# Patient Record
(demographics unavailable — no encounter records)

---

## 2024-11-22 NOTE — CARDIOLOGY SUMMARY
[de-identified] : 11/22/2024, NSR with IVCD, non-specific ST-T wave changes 5/10/2024, NSR with IVCD, non-specific ST-T wave changes 11/10/2023, NSR with non-specific T wave changes. 9/12/2022, NSR with low voltages, IVCD, non-specific T wave changes [de-identified] : 11/22/2024, LV EF 45%, mild MR, mild AI, RWMAs (stable from previous), trace TR with normal estimated PASP. 7/9/2022, LV EF 40-45%, mild-moderate MR, mild AI, basal inferolateral, inferoseptum, basal inferior wall hypokinesis. trace TR. 8/24/2021, LV EF 45-50%, mild MR, trace AI, basal septum, basal inferior hypokinesis, and basal inferolateral dyskinetic. abnormal septal motion, trace TR.

## 2024-11-22 NOTE — REVIEW OF SYSTEMS
[Easy Bleeding] : no tendency for easy bleeding [Easy Bruising] : no tendency for easy bruising [Negative] : Neurological [FreeTextEntry5] : see HPI [FreeTextEntry6] : see HPI [FreeTextEntry7] : see HPI

## 2024-11-22 NOTE — DISCUSSION/SUMMARY
[FreeTextEntry1] : 1. Coronary Artery Disease: according to previous cardiologist's documentation patient has  of the RCA with left-->right collaterals. Patient with no exertional chest pain or dyspnea. Recommend continuing Aspirin 81mg daily. Recommend continuing Toprol XL 50mg daily. Recommend continuing atorvastatin 80mg daily. Goal LDL is less than 70.   2. HTN: controlled. Continue  Toprol XL 50mg daily. Goal BP less than 130/80. Recommend low salt diet.   3. HLD: Goal LDL is less than 70.  Recommend continuing atorvastatin 80mg daily.   4. Hx of TIA: occurred in 2016. Appeared cryptogenic so patient underwent implantation of ILR. According to previous cardiologist documentation there was no documented PAF episodes. Battery is dead. Continue Aspirin 81mg daily and increase atorvastatin to 80mg daily. Goal LDL is less than 70.  5. Ischemic CM: echocardiogram report from July 2022 reviewed. LV EF 40- 45%. Regional wall motion abnormalities consistent with  of RCA. Periodic echo surveillance. Recommend continuing Toprol XL 50mg daily.   6. Aortic Valve Insufficiency: stable on echocardiogram from 11/*22/2024. Recommend periodic echo surveillance  Follow up in 6 months.  [EKG obtained to assist in diagnosis and management of assessed problem(s)] : EKG obtained to assist in diagnosis and management of assessed problem(s)

## 2024-11-22 NOTE — PHYSICAL EXAM
[Normal] : moves all extremities, no focal deficits, normal speech [de-identified] : no carotid artery bruits auscultated bilaterally, a firm non-tender submandibular mass palpated on the left